# Patient Record
Sex: MALE | Race: BLACK OR AFRICAN AMERICAN | Employment: OTHER | URBAN - METROPOLITAN AREA
[De-identification: names, ages, dates, MRNs, and addresses within clinical notes are randomized per-mention and may not be internally consistent; named-entity substitution may affect disease eponyms.]

---

## 2018-10-30 ENCOUNTER — HOSPITAL ENCOUNTER (EMERGENCY)
Facility: HOSPITAL | Age: 58
Discharge: HOME | End: 2018-10-30
Payer: MEDICAID

## 2018-10-30 ENCOUNTER — APPOINTMENT (OUTPATIENT)
Dept: GENERAL RADIOLOGY | Facility: HOSPITAL | Age: 58
End: 2018-10-30
Payer: MEDICAID

## 2018-10-30 VITALS
HEIGHT: 65 IN | BODY MASS INDEX: 18.99 KG/M2 | WEIGHT: 114 LBS | HEART RATE: 70 BPM | OXYGEN SATURATION: 98 % | RESPIRATION RATE: 24 BRPM | SYSTOLIC BLOOD PRESSURE: 142 MMHG | DIASTOLIC BLOOD PRESSURE: 80 MMHG | TEMPERATURE: 98 F

## 2018-10-30 DIAGNOSIS — J44.1 COPD EXACERBATION (HCC): Primary | ICD-10-CM

## 2018-10-30 DIAGNOSIS — I10 ESSENTIAL HYPERTENSION: ICD-10-CM

## 2018-10-30 PROCEDURE — 94640 AIRWAY INHALATION TREATMENT: CPT

## 2018-10-30 PROCEDURE — 71046 X-RAY EXAM CHEST 2 VIEWS: CPT

## 2018-10-30 PROCEDURE — 99284 EMERGENCY DEPT VISIT MOD MDM: CPT

## 2018-10-30 RX ORDER — IPRATROPIUM BROMIDE AND ALBUTEROL SULFATE 2.5; .5 MG/3ML; MG/3ML
3 SOLUTION RESPIRATORY (INHALATION) ONCE
Status: COMPLETED | OUTPATIENT
Start: 2018-10-30 | End: 2018-10-30

## 2018-10-30 RX ORDER — FLUTICASONE PROPIONATE AND SALMETEROL 250; 50 UG/1; UG/1
1 POWDER RESPIRATORY (INHALATION) 2 TIMES DAILY
Qty: 1 PACKAGE | Refills: 3 | Status: SHIPPED | OUTPATIENT
Start: 2018-10-30 | End: 2018-11-29

## 2018-10-30 RX ORDER — ALBUTEROL SULFATE 90 UG/1
2 AEROSOL, METERED RESPIRATORY (INHALATION) EVERY 4 HOURS PRN
Qty: 1 INHALER | Refills: 2 | Status: SHIPPED | OUTPATIENT
Start: 2018-10-30 | End: 2018-11-29

## 2018-10-30 RX ORDER — ALBUTEROL SULFATE 2.5 MG/3ML
2.5 SOLUTION RESPIRATORY (INHALATION) EVERY 4 HOURS PRN
Qty: 30 AMPULE | Refills: 2 | Status: SHIPPED | OUTPATIENT
Start: 2018-10-30 | End: 2018-11-29

## 2018-10-30 RX ORDER — PREDNISONE 20 MG/1
40 TABLET ORAL ONCE
Status: COMPLETED | OUTPATIENT
Start: 2018-10-30 | End: 2018-10-30

## 2018-10-30 RX ORDER — LISINOPRIL 20 MG/1
20 TABLET ORAL DAILY
Qty: 30 TABLET | Refills: 3 | Status: SHIPPED | OUTPATIENT
Start: 2018-10-30 | End: 2018-11-29

## 2018-10-30 RX ORDER — CITALOPRAM 20 MG/1
20 TABLET ORAL DAILY
Qty: 30 TABLET | Refills: 0 | Status: SHIPPED | OUTPATIENT
Start: 2018-10-30 | End: 2018-11-29

## 2018-10-30 RX ORDER — PREDNISONE 20 MG/1
40 TABLET ORAL DAILY
Qty: 10 TABLET | Refills: 0 | Status: SHIPPED | OUTPATIENT
Start: 2018-10-30 | End: 2018-11-04

## 2018-10-30 NOTE — ED PROVIDER NOTES
Patient Seen in: [facility] AND [facility] Emergency Department    History   No chief complaint on file. Stated Complaint: SOB Hx copd    HPI    HPI: Caleb Mann is a 60year old male with a history of COPDwho presents with wheezing and a cough.  Patient ha Temp src Oral   SpO2 98 %   O2 Device None (Room air)       Current:BP (!) 171/97   Pulse 91   Temp 97.7 °F (36.5 °C) (Oral)   Resp 26   Ht 165.1 cm (5' 5\")   Wt 51.7 kg   SpO2 98%   BMI 18.97 kg/m²   PULSE OX Nl on room air          Physical Exam  Cons 3    Citalopram Hydrobromide 20 MG Oral Tab  Take 1 tablet (20 mg total) by mouth daily.   Qty: 30 tablet Refills: 0    fluticasone-salmeterol (ADVAIR DISKUS) 250-50 MCG/DOSE Inhalation Aerosol Powder, Breath Activated  Inhale 1 puff into the lungs 2 (two) Inhalation Nebu Soln  Take 3 mL (2.5 mg total) by nebulization every 4 (four) hours as needed for Wheezing or Shortness of Breath.   Qty: 30 ampule Refills: 2

## 2019-01-09 PROBLEM — I10 ESSENTIAL HYPERTENSION: Status: ACTIVE | Noted: 2019-01-09

## 2019-01-09 PROBLEM — Z87.891 HISTORY OF TOBACCO ABUSE: Status: ACTIVE | Noted: 2019-01-09

## 2019-01-09 PROBLEM — B19.20 HEPATITIS C VIRUS INFECTION WITHOUT HEPATIC COMA, UNSPECIFIED CHRONICITY: Status: ACTIVE | Noted: 2019-01-09

## 2019-01-09 PROBLEM — J44.9 CHRONIC OBSTRUCTIVE PULMONARY DISEASE, UNSPECIFIED COPD TYPE (HCC): Status: ACTIVE | Noted: 2019-01-09

## 2019-01-09 PROBLEM — K74.00 HEPATIC FIBROSIS: Status: ACTIVE | Noted: 2019-01-09

## 2019-01-09 PROCEDURE — 87522 HEPATITIS C REVRS TRNSCRPJ: CPT | Performed by: INTERNAL MEDICINE

## 2019-01-09 PROCEDURE — 87902 NFCT AGT GNTYP ALYS HEP C: CPT | Performed by: INTERNAL MEDICINE

## 2019-01-09 PROCEDURE — 86803 HEPATITIS C AB TEST: CPT | Performed by: INTERNAL MEDICINE

## 2019-01-17 PROCEDURE — 86706 HEP B SURFACE ANTIBODY: CPT | Performed by: INTERNAL MEDICINE

## 2019-01-17 PROCEDURE — 86709 HEPATITIS A IGM ANTIBODY: CPT | Performed by: INTERNAL MEDICINE

## 2019-01-17 PROCEDURE — 84460 ALANINE AMINO (ALT) (SGPT): CPT | Performed by: INTERNAL MEDICINE

## 2019-01-17 PROCEDURE — 84520 ASSAY OF UREA NITROGEN: CPT | Performed by: INTERNAL MEDICINE

## 2019-01-17 PROCEDURE — 86708 HEPATITIS A ANTIBODY: CPT | Performed by: INTERNAL MEDICINE

## 2019-01-17 PROCEDURE — 84450 TRANSFERASE (AST) (SGOT): CPT | Performed by: INTERNAL MEDICINE

## 2019-01-17 PROCEDURE — 82977 ASSAY OF GGT: CPT | Performed by: INTERNAL MEDICINE

## 2019-01-17 PROCEDURE — 83883 ASSAY NEPHELOMETRY NOT SPEC: CPT | Performed by: INTERNAL MEDICINE

## 2019-03-27 PROBLEM — F33.1 MODERATE EPISODE OF RECURRENT MAJOR DEPRESSIVE DISORDER (HCC): Status: ACTIVE | Noted: 2019-03-27

## 2019-03-27 PROBLEM — R91.1 LUNG NODULE: Status: ACTIVE | Noted: 2019-03-27

## 2019-03-27 PROBLEM — I70.0 AORTIC ATHEROSCLEROSIS (HCC): Status: ACTIVE | Noted: 2019-03-27

## 2019-10-27 ENCOUNTER — APPOINTMENT (OUTPATIENT)
Dept: ULTRASOUND IMAGING | Facility: HOSPITAL | Age: 59
End: 2019-10-27
Attending: PHYSICIAN ASSISTANT
Payer: MEDICAID

## 2019-10-27 ENCOUNTER — APPOINTMENT (OUTPATIENT)
Dept: GENERAL RADIOLOGY | Facility: HOSPITAL | Age: 59
End: 2019-10-27
Attending: PHYSICIAN ASSISTANT
Payer: MEDICAID

## 2019-10-27 ENCOUNTER — HOSPITAL ENCOUNTER (EMERGENCY)
Facility: HOSPITAL | Age: 59
Discharge: HOME | End: 2019-10-27
Attending: PHYSICIAN ASSISTANT
Payer: MEDICAID

## 2019-10-27 VITALS
BODY MASS INDEX: 19.66 KG/M2 | HEART RATE: 84 BPM | OXYGEN SATURATION: 96 % | TEMPERATURE: 98 F | RESPIRATION RATE: 16 BRPM | SYSTOLIC BLOOD PRESSURE: 143 MMHG | WEIGHT: 118 LBS | DIASTOLIC BLOOD PRESSURE: 88 MMHG | HEIGHT: 65 IN

## 2019-10-27 DIAGNOSIS — R03.0 ELEVATED BLOOD PRESSURE READING: ICD-10-CM

## 2019-10-27 DIAGNOSIS — S80.12XA TRAUMATIC HEMATOMA OF LEFT LOWER LEG, INITIAL ENCOUNTER: Primary | ICD-10-CM

## 2019-10-27 PROCEDURE — 93971 EXTREMITY STUDY: CPT | Performed by: PHYSICIAN ASSISTANT

## 2019-10-27 PROCEDURE — 99284 EMERGENCY DEPT VISIT MOD MDM: CPT

## 2019-10-27 PROCEDURE — 73590 X-RAY EXAM OF LOWER LEG: CPT | Performed by: PHYSICIAN ASSISTANT

## 2019-10-27 RX ORDER — TRAMADOL HYDROCHLORIDE 50 MG/1
50 TABLET ORAL EVERY 6 HOURS PRN
Qty: 12 TABLET | Refills: 0 | Status: SHIPPED | OUTPATIENT
Start: 2019-10-27 | End: 2019-11-05
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
## 2019-10-28 NOTE — ED NOTES
Patient given discharge instructions and prescriptions. Patient verbalized understanding. Wheeled out of ED by family.
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
## 2020-04-07 ENCOUNTER — HOSPITAL ENCOUNTER (EMERGENCY)
Facility: HOSPITAL | Age: 60
Discharge: HOME | End: 2020-04-07
Attending: EMERGENCY MEDICINE
Payer: MEDICAID

## 2020-04-07 ENCOUNTER — APPOINTMENT (OUTPATIENT)
Dept: GENERAL RADIOLOGY | Facility: HOSPITAL | Age: 60
End: 2020-04-07
Payer: MEDICAID

## 2020-04-07 VITALS
SYSTOLIC BLOOD PRESSURE: 159 MMHG | RESPIRATION RATE: 18 BRPM | HEIGHT: 65 IN | WEIGHT: 118 LBS | TEMPERATURE: 98 F | OXYGEN SATURATION: 96 % | HEART RATE: 88 BPM | DIASTOLIC BLOOD PRESSURE: 78 MMHG | BODY MASS INDEX: 19.66 KG/M2

## 2020-04-07 DIAGNOSIS — S42.212A CLOSED FRACTURE OF NECK OF LEFT HUMERUS, INITIAL ENCOUNTER: Primary | ICD-10-CM

## 2020-04-07 PROCEDURE — 73030 X-RAY EXAM OF SHOULDER: CPT | Performed by: EMERGENCY MEDICINE

## 2020-04-07 PROCEDURE — 99284 EMERGENCY DEPT VISIT MOD MDM: CPT

## 2020-04-07 RX ORDER — HYDROCODONE BITARTRATE AND ACETAMINOPHEN 5; 325 MG/1; MG/1
2 TABLET ORAL ONCE
Status: COMPLETED | OUTPATIENT
Start: 2020-04-07 | End: 2020-04-07
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
## 2020-04-08 NOTE — ED PROVIDER NOTES
Patient Seen in: [facility] AND [facility] Emergency Department    History   Patient presents with:  Upper Extremity Injury      HPI    Patient presents to the ED after falling at [place] and landing on his left shoulder.   He states that he fell due to trippin droplet mask on my arrival to the room.  Personal protective equipment including droplet mask, eye protection, and gloves were worn throughout the duration of the exam.  Handwashing was performed prior to and after the exam.  Stethoscope and any equipment u SpO2: 95% 96%   Weight: 53.5 kg    Height: 165.1 cm (5' 5\")      *I personally reviewed and interpreted all ED vitals.     Pulse Ox: 96%, Room air, Normal     Differential Diagnosis/ Diagnostic Considerations: Humeral fracture, shoulder dislocation    Me

## 2020-04-20 ENCOUNTER — APPOINTMENT (OUTPATIENT)
Dept: CT IMAGING | Facility: HOSPITAL | Age: 60
DRG: 191 | End: 2020-04-20
Attending: EMERGENCY MEDICINE
Payer: MEDICAID

## 2020-04-20 ENCOUNTER — HOSPITAL ENCOUNTER (INPATIENT)
Facility: HOSPITAL | Age: 60
LOS: 2 days | Discharge: HOME HEALTH | DRG: 191 | End: 2020-04-22
Attending: EMERGENCY MEDICINE | Admitting: INTERNAL MEDICINE
Payer: MEDICAID

## 2020-04-20 ENCOUNTER — APPOINTMENT (OUTPATIENT)
Dept: GENERAL RADIOLOGY | Facility: HOSPITAL | Age: 60
DRG: 191 | End: 2020-04-20
Attending: EMERGENCY MEDICINE
Payer: MEDICAID

## 2020-04-20 DIAGNOSIS — J44.1 COPD EXACERBATION (HCC): Primary | ICD-10-CM

## 2020-04-20 PROBLEM — E87.1 HYPONATREMIA: Status: ACTIVE | Noted: 2020-04-20

## 2020-04-20 PROBLEM — E87.2 METABOLIC ACIDOSIS: Status: ACTIVE | Noted: 2020-04-20

## 2020-04-20 PROBLEM — D64.9 ANEMIA: Status: ACTIVE | Noted: 2020-04-20

## 2020-04-20 PROBLEM — R73.9 HYPERGLYCEMIA: Status: ACTIVE | Noted: 2020-04-20

## 2020-04-20 PROBLEM — E87.20 METABOLIC ACIDOSIS: Status: ACTIVE | Noted: 2020-04-20

## 2020-04-20 PROBLEM — R79.89 AZOTEMIA: Status: ACTIVE | Noted: 2020-04-20

## 2020-04-20 PROCEDURE — 84484 ASSAY OF TROPONIN QUANT: CPT | Performed by: EMERGENCY MEDICINE

## 2020-04-20 PROCEDURE — 84145 PROCALCITONIN (PCT): CPT | Performed by: HOSPITALIST

## 2020-04-20 PROCEDURE — 93005 ELECTROCARDIOGRAM TRACING: CPT

## 2020-04-20 PROCEDURE — 83615 LACTATE (LD) (LDH) ENZYME: CPT | Performed by: HOSPITALIST

## 2020-04-20 PROCEDURE — 94640 AIRWAY INHALATION TREATMENT: CPT

## 2020-04-20 PROCEDURE — 93010 ELECTROCARDIOGRAM REPORT: CPT | Performed by: EMERGENCY MEDICINE

## 2020-04-20 PROCEDURE — 86141 C-REACTIVE PROTEIN HS: CPT | Performed by: HOSPITALIST

## 2020-04-20 PROCEDURE — 82728 ASSAY OF FERRITIN: CPT | Performed by: HOSPITALIST

## 2020-04-20 PROCEDURE — 85379 FIBRIN DEGRADATION QUANT: CPT | Performed by: EMERGENCY MEDICINE

## 2020-04-20 PROCEDURE — 80048 BASIC METABOLIC PNL TOTAL CA: CPT | Performed by: EMERGENCY MEDICINE

## 2020-04-20 PROCEDURE — 85025 COMPLETE CBC W/AUTO DIFF WBC: CPT | Performed by: EMERGENCY MEDICINE

## 2020-04-20 PROCEDURE — 96374 THER/PROPH/DIAG INJ IV PUSH: CPT

## 2020-04-20 PROCEDURE — 71045 X-RAY EXAM CHEST 1 VIEW: CPT | Performed by: EMERGENCY MEDICINE

## 2020-04-20 PROCEDURE — 99285 EMERGENCY DEPT VISIT HI MDM: CPT

## 2020-04-20 PROCEDURE — 80076 HEPATIC FUNCTION PANEL: CPT | Performed by: HOSPITALIST

## 2020-04-20 PROCEDURE — 85379 FIBRIN DEGRADATION QUANT: CPT | Performed by: HOSPITALIST

## 2020-04-20 PROCEDURE — 71260 CT THORAX DX C+: CPT | Performed by: EMERGENCY MEDICINE

## 2020-04-20 RX ORDER — ENOXAPARIN SODIUM 100 MG/ML
40 INJECTION SUBCUTANEOUS DAILY
Status: DISCONTINUED | OUTPATIENT
Start: 2020-04-20 | End: 2020-04-22

## 2020-04-20 RX ORDER — ONDANSETRON 2 MG/ML
4 INJECTION INTRAMUSCULAR; INTRAVENOUS EVERY 6 HOURS PRN
Status: DISCONTINUED | OUTPATIENT
Start: 2020-04-20 | End: 2020-04-22

## 2020-04-20 RX ORDER — METHYLPREDNISOLONE SODIUM SUCCINATE 40 MG/ML
40 INJECTION, POWDER, LYOPHILIZED, FOR SOLUTION INTRAMUSCULAR; INTRAVENOUS EVERY 8 HOURS
Status: COMPLETED | OUTPATIENT
Start: 2020-04-20 | End: 2020-04-20

## 2020-04-20 RX ORDER — MELATONIN
100 DAILY
Status: DISCONTINUED | OUTPATIENT
Start: 2020-04-20 | End: 2020-04-22

## 2020-04-20 RX ORDER — PREDNISONE 20 MG/1
40 TABLET ORAL
Status: DISCONTINUED | OUTPATIENT
Start: 2020-04-21 | End: 2020-04-22

## 2020-04-20 RX ORDER — ALBUTEROL SULFATE 90 UG/1
2 AEROSOL, METERED RESPIRATORY (INHALATION) EVERY 6 HOURS PRN
Status: ON HOLD | COMMUNITY
End: 2020-04-22

## 2020-04-20 RX ORDER — MULTIPLE VITAMINS W/ MINERALS TAB 9MG-400MCG
1 TAB ORAL DAILY
Status: DISCONTINUED | OUTPATIENT
Start: 2020-04-20 | End: 2020-04-22

## 2020-04-20 RX ORDER — SODIUM CHLORIDE 0.9 % (FLUSH) 0.9 %
3 SYRINGE (ML) INJECTION AS NEEDED
Status: DISCONTINUED | OUTPATIENT
Start: 2020-04-20 | End: 2020-04-22

## 2020-04-20 RX ORDER — ACETAMINOPHEN 325 MG/1
650 TABLET ORAL EVERY 6 HOURS PRN
Status: DISCONTINUED | OUTPATIENT
Start: 2020-04-20 | End: 2020-04-22

## 2020-04-20 RX ORDER — METHYLPREDNISOLONE SODIUM SUCCINATE 125 MG/2ML
125 INJECTION, POWDER, LYOPHILIZED, FOR SOLUTION INTRAMUSCULAR; INTRAVENOUS ONCE
Status: COMPLETED | OUTPATIENT
Start: 2020-04-20 | End: 2020-04-20

## 2020-04-20 RX ORDER — ETOMIDATE 2 MG/ML
INJECTION INTRAVENOUS
Status: DISCONTINUED
Start: 2020-04-20 | End: 2020-04-20 | Stop reason: WASHOUT

## 2020-04-20 RX ORDER — METOCLOPRAMIDE HYDROCHLORIDE 5 MG/ML
10 INJECTION INTRAMUSCULAR; INTRAVENOUS EVERY 8 HOURS PRN
Status: DISCONTINUED | OUTPATIENT
Start: 2020-04-20 | End: 2020-04-22

## 2020-04-20 RX ORDER — ALBUTEROL SULFATE 90 UG/1
2 AEROSOL, METERED RESPIRATORY (INHALATION) EVERY 4 HOURS
Status: DISCONTINUED | OUTPATIENT
Start: 2020-04-20 | End: 2020-04-22

## 2020-04-20 RX ORDER — ALBUTEROL SULFATE 90 UG/1
2 AEROSOL, METERED RESPIRATORY (INHALATION) ONCE
Status: COMPLETED | OUTPATIENT
Start: 2020-04-20 | End: 2020-04-20

## 2020-04-20 NOTE — HOME CARE LIAISON
Received referral from [address] [place] [address]. Spoke to the patient regarding home health services. Patient is agreeable to [facility]. Residential contact information provided. All questions addressed and answered.

## 2020-04-20 NOTE — H&P
[facility] HOSPITALIST HISTORY AND PHYSICAL     CC: Patient presents with:  Dyspnea JANIA SOB       PCP: Augustine Beth MD    History of Present Illness:   Patient is a 60year old male with PMH sig for COPD, former smoker, HTN, MDD here with SOB- sudden onset no rashes, no lesion  PSYCH- normal mentation, normal affect      LABS:   Lab Results   Component Value Date    WBC 9.7 04/20/2020    HGB 9.5 04/20/2020    HCT 30.2 04/20/2020    .0 04/20/2020    CREATSERUM 1.16 04/20/2020    BUN 26 04/20/2020    NA 4/20/2020 at 6:18 AM          Ct Chest Pain/pe (iv Only) (cpt=71260)    Result Date: 4/20/2020  PROCEDURE: CT CHEST PAIN/PE (IV ONLY) (CPT=71260)  COMPARISON: [facility], XR CHEST AP PORTABLE (CPT=71045), 4/20/2020, 4:02 AM.  INDICATIONS: d discussed. Indeterminate focal density in the right upper lobe measuring 1.1 x 0.8 cm may be an area of scarring, but scar carcinoma, early mass cannot be excluded.   Recommend short-term follow-up study in 3 months to further assess or possible PET scanni

## 2020-04-20 NOTE — CONSULTS
Pulmonary H&P/Consult     NAME: Erick Members - ROOM: [address] [place], [place] [address]A - MRN: [id number] - Age: 60year old - :  1960    Date of Admission: 2020  3:17 AM  Ad[facility] Diagnosis: [facility] exacerbation [facility] [J44.1]    Assessment/Plan:  1.  Dyspnea - from CO HPI    OBJECTIVE:    Intake/Output Summary (Last 24 hours) at 4/20/2020 1035  Last data filed at 4/20/2020 0800  Gross per 24 hour   Intake —   Output 200 ml   Net -200 ml     /85   Pulse 91   Temp 98 °F (36.7 °C)   Resp 26   Ht 5' 5\" (1.651 m)   [facility]
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
## 2020-04-20 NOTE — PLAN OF CARE
Problem: Patient/Family Goals  Goal: Patient/Family Long Term Goal  Description  Patient's Long Term Goal: to return home with friends  Interventions:  - 02, rest  - See additional Care Plan goals for specific interventions  Outcome: Progressing  Goal: P

## 2020-04-20 NOTE — ED PROVIDER NOTES
Patient Seen in: [facility] Emergency Department      History   Patient presents with:  Dyspnea JANIA SOB    Stated Complaint:     HPI    61 yo male with h/o COPD presents with difficulty breathing. No chest pain. Has had mild cough. No fever.  No kn Refill: Capillary refill takes less than 2 seconds. Neurological:      Mental Status: He is alert. Sensory: No sensory deficit. Motor: No abnormal muscle tone.    Psychiatric:         Mood and Affect: Mood normal.         Behavior: Behavior norm and right lower lung, unclear if this is related to an acute process or chronic areas of scarring or atelectasis. Elsewhere, no focal consolidation. No pneumothorax. Hyperinflated lungs suggestive of COPD. Cardiomediastinal silhouette unremarkable.   Co

## 2020-04-20 NOTE — DIETARY NOTE
ADULT NUTRITION INITIAL ASSESSMENT    RECOMMENDATIONS TO MD:  See Nutrition Intervention     Pt is at moderate nutrition risk. Pt meets moderate malnutrition criteria.       CRITERIA FOR MALNUTRITION DIAGNOSIS: Criteria for non-severe malnutrition diagnosi supplements), Requested  with ordering meals, tray set up and/or feeding as needed, Ordered multivitamin and Ordered thiamin, arranged snacks.    -  Diet:     Continue prescribed diet, Regular/General and Chopped  - Medical Food or Oral Nutri Estimated Nutrition needs:   Calories: 1686-1481 calories/day (30-35 calories per kg Actual body wt (ABW))  Protein: 74-83 g protein/day (1.5-1.7 g protein/kg  Actual body wt (ABW))    MONITOR AND EVALUATE/NUTRITION GOALS:  - Food and Nutrient Intake:    Marko Dempsey

## 2020-04-20 NOTE — CM/SW NOTE
Received MDO for assessment home health. Spoke with patient for assessment. Patient states he lives with his 2 lifelong friends (who are brothers) in a 2 story home. Patient states he is independent with ADLs/IADLs, he has a cane which he does not use.  He

## 2020-04-21 PROCEDURE — 86140 C-REACTIVE PROTEIN: CPT | Performed by: HOSPITALIST

## 2020-04-21 PROCEDURE — 80053 COMPREHEN METABOLIC PANEL: CPT | Performed by: HOSPITALIST

## 2020-04-21 PROCEDURE — 85025 COMPLETE CBC W/AUTO DIFF WBC: CPT | Performed by: HOSPITALIST

## 2020-04-21 PROCEDURE — 85379 FIBRIN DEGRADATION QUANT: CPT | Performed by: HOSPITALIST

## 2020-04-21 PROCEDURE — 83615 LACTATE (LD) (LDH) ENZYME: CPT | Performed by: HOSPITALIST

## 2020-04-21 RX ORDER — TRAMADOL HYDROCHLORIDE 50 MG/1
50 TABLET ORAL EVERY 6 HOURS PRN
Status: DISCONTINUED | OUTPATIENT
Start: 2020-04-21 | End: 2020-04-22

## 2020-04-21 NOTE — PAYOR COMM NOTE
--------------  ADMISSION REVIEW     Payor: Helio Berry #:  [id number]  Authorization Number: [id number]    Admit date: 4/20/20  Admit time: 9165       Admitting Physician: Ashley Green DO  Attending Physician deficits  SKIN- no rashes, no lesion  PSYCH- normal mentation, normal affect      LABS:   Lab Results   Component Value Date    WBC 9.7 04/20/2020    HGB 9.5 04/20/2020    HCT 30.2 04/20/2020    .0 04/20/2020    CREATSERUM 1.16 04/20/2020    BUN 26 Stacy Godwin MD on 4/20/2020 at 6:18 AM          Ct Chest Pain/pe (iv Only) (cpt=71260)    Result Date: 4/20/2020  PROCEDURE: CT CHEST PAIN/PE (IV ONLY) (CPT=71260)  COMPARISON: [facility], XR CHEST AP PORTABLE (CPT=71045), 4/20/2020, 4:02 AM.  I bilaterally as discussed. Indeterminate focal density in the right upper lobe measuring 1.1 x 0.8 cm may be an area of scarring, but scar carcinoma, early mass cannot be excluded.   Recommend short-term follow-up study in 3 months to further assess or poss Dose Route User    4/21/2020 0620 Given 2 puff Inhalation Connie Del Rosario RN    4/21/2020 0026 Given 2 puff Inhalation Connie Del Rosario RN    4/20/2020 2039 Given 2 puff Inhalation Connie Del Rosario [facility]    4/20/2020 1530 Given 2 puff Inhalation Rita Gee MRG  Abdo - soft, NTND  Ext - no LE edema  Mental status - interactive, answering questions appropriately     Medications:  Reviewed in EMR     Lab Data:  Reviewed in EMR     Imaging:  I independently visualized all relevant chest imaging in PACS and agree

## 2020-04-21 NOTE — PROGRESS NOTES
Critical Care Progress Note     Assessment / Plan:  1. Dyspnea - from COPD exacerbation   - on RA  - IV steroids to po today  - albuterol MDI  - SARS-COV-2 rapid testing negative, PCR pending. In isolation  - CTA PE protocol without PE  2.  Pulmonary nodule

## 2020-04-21 NOTE — PROGRESS NOTES
[facility] Hospitalist Progress Note     PCP: Jonathon Babin MD    Chief Complaint: follow-up      SUBJECTIVE:  BP up, HR to 160s with exertion pt reports feeling very SOB at the time     OBJECTIVE:  Temp:  [97.2 °F (36.2 °C)-99 °F (37.2 °C)] 97.2 °F (36.2 x 0.8 cm lesions- scar v other  MDD/ anxiety  HTN     SOB and hypoxia  - rapid COVID swab neg, f/u test neg as well  - D dimer elevated- CT neg for acute PE  - findings c/w COPD changes also incidental RUL lesion noted  - started on steroids for COPD exac

## 2020-04-22 VITALS
WEIGHT: 113.63 LBS | HEIGHT: 65 IN | RESPIRATION RATE: 20 BRPM | HEART RATE: 79 BPM | BODY MASS INDEX: 18.93 KG/M2 | OXYGEN SATURATION: 94 % | SYSTOLIC BLOOD PRESSURE: 161 MMHG | TEMPERATURE: 98 F | DIASTOLIC BLOOD PRESSURE: 83 MMHG

## 2020-04-22 PROCEDURE — 97116 GAIT TRAINING THERAPY: CPT

## 2020-04-22 PROCEDURE — 97161 PT EVAL LOW COMPLEX 20 MIN: CPT

## 2020-04-22 PROCEDURE — 85025 COMPLETE CBC W/AUTO DIFF WBC: CPT | Performed by: HOSPITALIST

## 2020-04-22 PROCEDURE — 80048 BASIC METABOLIC PNL TOTAL CA: CPT | Performed by: INTERNAL MEDICINE

## 2020-04-22 PROCEDURE — 83735 ASSAY OF MAGNESIUM: CPT | Performed by: INTERNAL MEDICINE

## 2020-04-22 PROCEDURE — 97162 PT EVAL MOD COMPLEX 30 MIN: CPT

## 2020-04-22 PROCEDURE — 97166 OT EVAL MOD COMPLEX 45 MIN: CPT

## 2020-04-22 RX ORDER — PREDNISONE 20 MG/1
40 TABLET ORAL
Qty: 6 TABLET | Refills: 0 | Status: SHIPPED | OUTPATIENT
Start: 2020-04-23 | End: 2020-04-26

## 2020-04-22 RX ORDER — ALBUTEROL SULFATE 90 UG/1
2 AEROSOL, METERED RESPIRATORY (INHALATION) EVERY 6 HOURS PRN
Qty: 1 INHALER | Refills: 0 | Status: SHIPPED | OUTPATIENT
Start: 2020-04-22 | End: 2020-04-25

## 2020-04-22 RX ORDER — TRAMADOL HYDROCHLORIDE 50 MG/1
50 TABLET ORAL EVERY 6 HOURS PRN
Qty: 20 TABLET | Refills: 0 | Status: SHIPPED | OUTPATIENT
Start: 2020-04-22 | End: 2020-07-01

## 2020-04-22 RX ORDER — MELATONIN
100 DAILY
Qty: 30 TABLET | Refills: 0 | Status: SHIPPED | OUTPATIENT
Start: 2020-04-23

## 2020-04-22 NOTE — PHYSICAL THERAPY NOTE
PHYSICAL THERAPY EVALUATION - INPATIENT     Room Number: 562/562-A  Evaluation Date: 4/22/2020  Type of Evaluation: Initial   Physician Order: See Comment for Specific Order(weakness)    Presenting Problem: SOB, hypoxia, COPD  Reason for Therapy: Mobility status. The patient's Approx Degree of Impairment: 41.77% has been calculated based on documentation in the [facility] '6 clicks' Inpatient Basic Mobility Short Form. Research supports that patients with this level of impairment may benefit from home with [facility]. ASSESSMENT  Rating: Other (Comment)(no c/o pain)    COGNITION  · Overall Cognitive Status:  WFL - within functional limits    RANGE OF MOTION AND STRENGTH ASSESSMENT  Upper extremity ROM and strength are within functional limits except LUE remained in immo training  Transfer training    Patient End of Session: Up in chair;Needs met;RN aware of session/findings;Call light within reach; All patient questions and concerns addressed; Alarm set    CURRENT GOALS    Goals to be met by: 5/6/20  Patient Goal Patient's

## 2020-04-22 NOTE — PLAN OF CARE
Problem: Patient/Family Goals  Goal: Patient/Family Long Term Goal  Description  Patient's Long Term Goal: return home     Interventions:    - follow doctors recommendations     - See additional Care Plan goals for specific interventions   Outcome: Progr alert and oriented x4, on room air sat 95%, patient high fall risk ,patient instructed to call for assistance when needed , droplet and contact isolation maintained

## 2020-04-22 NOTE — OCCUPATIONAL THERAPY NOTE
OCCUPATIONAL THERAPY EVALUATION - INPATIENT     Room Number: 562/562-A  Evaluation Date: 4/22/2020  Type of Evaluation: Initial  Presenting Problem: (COPD exacerbation )    Physician Order: IP Consult to Occupational Therapy  Reason for Therapy: ADL/IADL D [facility])  Active Problems:    Hyponatremia    Anemia    Azotemia    Metabolic acidosis    Hyperglycemia      Past Medical History  Past Medical History:   Diagnosis Date   • Asthma    • COPD (chronic obstructive pulmonary disease) ([facility]    • Depression    • E Sitting: good  Dynamic Sitting: good  Static Standing: good  Dynamic Standing: fair+     FUNCTIONAL ADL ASSESSMENT  Grooming: mod I   Feeding: indep  Bathing: min a   Toileting: sba  Upper Extremity Dressing: min a   Lower Extremity Dressing: min a     Edu

## 2020-04-22 NOTE — PROGRESS NOTES
Pulmonary Progress Note     Assessment / Plan:  1. Dyspnea - due to AECOPD. CTA chest negative for PE. SARS-COV-2 testing negative  - on RA  - prednisone burst  - albuterol MDI  2. Pulmonary nodule  - outpatient surveillance imaging in 3 months  3.  Ppx  -

## 2020-04-22 NOTE — PLAN OF CARE
Patient transfer from CCU room 211, patient received with remote tele,patient wearing left shoulder sling, has his cell phone, lower and upper dentures, clothes.  Patient alert and oriented x4, vital signs stable , droplet and contact isolations maintained

## 2020-04-22 NOTE — DISCHARGE SUMMARY
[facility] Internal Medicine Discharge Summary   Patient ID:  Brionna Kebede  [id number]  60year old  5/14/1960    Admit date: 4/20/2020    Discharge date and time: 4/22/2020     Attending Physician: Karoline Guzman MD     Primary Care Physician: Beronica Sanchez given    Day of discharge Exam    04/22/20  0957   BP: (!) 161/83   Pulse: 79   Resp: 20   Temp: 97.7 °F (36.5 °C)       Exam on day of discharge:  Gen: No acute distress  CV: RRR  Lungs: CTAB, good respiratory effort  Abdomen: s/nt/nd  Neuro: no focal def LUNGS/PLEURA: Hyperinflation compatible with COPD changes. Patchy airspace disease in the left perihilar and right lung base which may suggest interstitial scarring , although I can't exclude a developing acute pneumonia.   Correlate clinically and follow- changes noted throughout the bilateral lung fields. Focal bullous/bleb formation in the upper lung fields bilaterally right more than left.   Indeterminate focal density at the right upper lobe measuring 1.1 x 0.8  cm may be a area of scarring but scar car Hospitalist  [phone]  4/22/2020  11:37 AM

## 2020-04-22 NOTE — CM/SW NOTE
Case Management/ Progression of Care    A [facility] was made to [facility], Patient is agreeable, Orders and face to face entered into [facility] and in discharge instructions.        PLAN: Home with [facility]    SW/CM to remain availab

## 2020-04-22 NOTE — PLAN OF CARE
Problem: Patient/Family Goals  Goal: Patient/Family Long Term Goal  Description  Patient's Long Term Goal: return home     Interventions:    - follow doctors recommendations     - See additional Care Plan goals for specific interventions   Outcome: Compl

## 2020-04-22 NOTE — PLAN OF CARE
Patient discharged. Transported by [employer]. Discharge forms reviewed with patient. IV removed.

## 2020-04-23 ENCOUNTER — PATIENT OUTREACH (OUTPATIENT)
Dept: CASE MANAGEMENT | Age: 60
End: 2020-04-23
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
## 2020-04-28 PROBLEM — R93.89 ABNORMAL CHEST CT: Status: ACTIVE | Noted: 2020-04-28

## 2020-06-05 PROBLEM — M25.512 ACUTE PAIN OF LEFT SHOULDER: Status: ACTIVE | Noted: 2020-06-05

## 2020-06-05 PROBLEM — S42.292D: Status: ACTIVE | Noted: 2020-06-05

## 2020-08-22 ENCOUNTER — APPOINTMENT (OUTPATIENT)
Dept: CT IMAGING | Facility: HOSPITAL | Age: 60
End: 2020-08-22
Attending: EMERGENCY MEDICINE
Payer: MEDICAID

## 2020-08-22 ENCOUNTER — HOSPITAL ENCOUNTER (EMERGENCY)
Facility: HOSPITAL | Age: 60
Discharge: HOME | End: 2020-08-22
Attending: EMERGENCY MEDICINE
Payer: MEDICAID

## 2020-08-22 ENCOUNTER — APPOINTMENT (OUTPATIENT)
Dept: GENERAL RADIOLOGY | Facility: HOSPITAL | Age: 60
End: 2020-08-22
Attending: EMERGENCY MEDICINE
Payer: MEDICAID

## 2020-08-22 VITALS
RESPIRATION RATE: 18 BRPM | OXYGEN SATURATION: 97 % | DIASTOLIC BLOOD PRESSURE: 81 MMHG | TEMPERATURE: 98 F | SYSTOLIC BLOOD PRESSURE: 151 MMHG | HEART RATE: 82 BPM

## 2020-08-22 DIAGNOSIS — S01.81XA FACIAL LACERATION, INITIAL ENCOUNTER: Primary | ICD-10-CM

## 2020-08-22 DIAGNOSIS — S00.03XA CONTUSION OF SCALP, INITIAL ENCOUNTER: ICD-10-CM

## 2020-08-22 DIAGNOSIS — T07.XXXA MULTIPLE ABRASIONS: ICD-10-CM

## 2020-08-22 DIAGNOSIS — S52.124A CLOSED NONDISPLACED FRACTURE OF HEAD OF RIGHT RADIUS, INITIAL ENCOUNTER: ICD-10-CM

## 2020-08-22 DIAGNOSIS — S80.11XA CONTUSION OF RIGHT LOWER LEG, INITIAL ENCOUNTER: ICD-10-CM

## 2020-08-22 DIAGNOSIS — S52.044A NONDISPLACED FRACTURE OF CORONOID PROCESS OF RIGHT ULNA, INITIAL ENCOUNTER FOR CLOSED FRACTURE: ICD-10-CM

## 2020-08-22 PROCEDURE — 70486 CT MAXILLOFACIAL W/O DYE: CPT | Performed by: EMERGENCY MEDICINE

## 2020-08-22 PROCEDURE — 73590 X-RAY EXAM OF LOWER LEG: CPT | Performed by: EMERGENCY MEDICINE

## 2020-08-22 PROCEDURE — 70450 CT HEAD/BRAIN W/O DYE: CPT | Performed by: EMERGENCY MEDICINE

## 2020-08-22 PROCEDURE — 12014 RPR F/E/E/N/L/M 5.1-7.5 CM: CPT

## 2020-08-22 PROCEDURE — 99284 EMERGENCY DEPT VISIT MOD MDM: CPT

## 2020-08-22 PROCEDURE — 73080 X-RAY EXAM OF ELBOW: CPT | Performed by: EMERGENCY MEDICINE

## 2020-08-22 RX ORDER — LIDOCAINE HYDROCHLORIDE 10 MG/ML
20 INJECTION, SOLUTION EPIDURAL; INFILTRATION; INTRACAUDAL; PERINEURAL ONCE
Status: COMPLETED | OUTPATIENT
Start: 2020-08-22 | End: 2020-08-22

## 2020-08-22 RX ORDER — LIDOCAINE HYDROCHLORIDE 10 MG/ML
INJECTION, SOLUTION EPIDURAL; INFILTRATION; INTRACAUDAL; PERINEURAL
Status: COMPLETED
Start: 2020-08-22 | End: 2020-08-22
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
## 2021-01-06 PROBLEM — I47.10 SVT (SUPRAVENTRICULAR TACHYCARDIA) (HCC): Status: ACTIVE | Noted: 2021-01-06

## 2021-01-06 PROBLEM — E87.1 HYPONATREMIA: Status: RESOLVED | Noted: 2020-04-20 | Resolved: 2021-01-06

## 2021-01-06 PROBLEM — I47.1 SVT (SUPRAVENTRICULAR TACHYCARDIA) (HCC): Status: ACTIVE | Noted: 2021-01-06

## 2021-12-28 ENCOUNTER — HOSPITAL ENCOUNTER (OUTPATIENT)
Dept: CT IMAGING | Facility: HOSPITAL | Age: 61
Discharge: HOME | End: 2021-12-28
Attending: INTERNAL MEDICINE
Payer: MEDICAID

## 2021-12-28 DIAGNOSIS — Z87.891 FORMER TOBACCO USE: ICD-10-CM

## 2021-12-28 PROCEDURE — 71271 CT THORAX LUNG CANCER SCR C-: CPT | Performed by: INTERNAL MEDICINE

## 2023-04-24 ENCOUNTER — HOSPITAL ENCOUNTER (OUTPATIENT)
Dept: CT IMAGING | Facility: HOSPITAL | Age: 63
Discharge: HOME | End: 2023-04-24
Attending: INTERNAL MEDICINE
Payer: MEDICAID

## 2023-04-24 DIAGNOSIS — Z87.891 PERSONAL HISTORY OF NICOTINE DEPENDENCE: ICD-10-CM

## 2023-04-24 PROCEDURE — 71271 CT THORAX LUNG CANCER SCR C-: CPT | Performed by: INTERNAL MEDICINE

## 2023-05-19 ENCOUNTER — HOSPITAL ENCOUNTER (OUTPATIENT)
Dept: CT IMAGING | Facility: HOSPITAL | Age: 63
Discharge: HOME | End: 2023-05-19
Attending: INTERNAL MEDICINE
Payer: MEDICAID

## 2023-05-19 DIAGNOSIS — Z87.891 PERSONAL HISTORY OF TOBACCO USE, PRESENTING HAZARDS TO HEALTH: ICD-10-CM

## 2023-05-19 PROCEDURE — 71271 CT THORAX LUNG CANCER SCR C-: CPT | Performed by: INTERNAL MEDICINE

## 2023-06-01 ENCOUNTER — APPOINTMENT (OUTPATIENT)
Dept: CT IMAGING | Facility: HOSPITAL | Age: 63
End: 2023-06-01
Attending: EMERGENCY MEDICINE
Payer: MEDICAID

## 2023-06-01 ENCOUNTER — APPOINTMENT (OUTPATIENT)
Dept: GENERAL RADIOLOGY | Facility: HOSPITAL | Age: 63
End: 2023-06-01
Attending: EMERGENCY MEDICINE
Payer: MEDICAID

## 2023-06-01 ENCOUNTER — HOSPITAL ENCOUNTER (EMERGENCY)
Facility: HOSPITAL | Age: 63
Discharge: OTHER INSTITUTION | End: 2023-06-01
Attending: EMERGENCY MEDICINE
Payer: MEDICAID

## 2023-06-01 VITALS
BODY MASS INDEX: 19.99 KG/M2 | HEIGHT: 65 IN | SYSTOLIC BLOOD PRESSURE: 125 MMHG | TEMPERATURE: 98 F | DIASTOLIC BLOOD PRESSURE: 70 MMHG | HEART RATE: 62 BPM | WEIGHT: 120 LBS | OXYGEN SATURATION: 99 % | RESPIRATION RATE: 18 BRPM

## 2023-06-01 DIAGNOSIS — S20.212A RIB CONTUSION, LEFT, INITIAL ENCOUNTER: Primary | ICD-10-CM

## 2023-06-01 DIAGNOSIS — S02.85XB OPEN FRACTURE OF ORBIT, INITIAL ENCOUNTER (HCC): ICD-10-CM

## 2023-06-01 DIAGNOSIS — S22.42XA CLOSED FRACTURE OF MULTIPLE RIBS OF LEFT SIDE, INITIAL ENCOUNTER: ICD-10-CM

## 2023-06-01 DIAGNOSIS — S01.111A LACERATION OF RIGHT EYEBROW, INITIAL ENCOUNTER: ICD-10-CM

## 2023-06-01 PROCEDURE — 96375 TX/PRO/DX INJ NEW DRUG ADDON: CPT

## 2023-06-01 PROCEDURE — 70486 CT MAXILLOFACIAL W/O DYE: CPT | Performed by: EMERGENCY MEDICINE

## 2023-06-01 PROCEDURE — 96366 THER/PROPH/DIAG IV INF ADDON: CPT

## 2023-06-01 PROCEDURE — 12013 RPR F/E/E/N/L/M 2.6-5.0 CM: CPT

## 2023-06-01 PROCEDURE — 96376 TX/PRO/DX INJ SAME DRUG ADON: CPT

## 2023-06-01 PROCEDURE — 96365 THER/PROPH/DIAG IV INF INIT: CPT

## 2023-06-01 PROCEDURE — 71101 X-RAY EXAM UNILAT RIBS/CHEST: CPT | Performed by: EMERGENCY MEDICINE

## 2023-06-01 PROCEDURE — 99291 CRITICAL CARE FIRST HOUR: CPT

## 2023-06-01 PROCEDURE — 70450 CT HEAD/BRAIN W/O DYE: CPT | Performed by: EMERGENCY MEDICINE

## 2023-06-01 RX ORDER — HYDROCODONE BITARTRATE AND ACETAMINOPHEN 5; 325 MG/1; MG/1
0.5 TABLET ORAL EVERY 6 HOURS PRN
Qty: 5 TABLET | Refills: 0 | Status: SHIPPED | OUTPATIENT
Start: 2023-06-01 | End: 2023-06-02

## 2023-06-01 RX ORDER — MORPHINE SULFATE 2 MG/ML
2 INJECTION, SOLUTION INTRAMUSCULAR; INTRAVENOUS ONCE
Status: COMPLETED | OUTPATIENT
Start: 2023-06-01 | End: 2023-06-01

## 2023-06-01 RX ORDER — MORPHINE SULFATE 2 MG/ML
INJECTION, SOLUTION INTRAMUSCULAR; INTRAVENOUS
Status: COMPLETED
Start: 2023-06-01 | End: 2023-06-01

## 2023-06-01 RX ORDER — LIDOCAINE HYDROCHLORIDE AND EPINEPHRINE 20; 5 MG/ML; UG/ML
20 INJECTION, SOLUTION EPIDURAL; INFILTRATION; INTRACAUDAL; PERINEURAL ONCE
Status: COMPLETED | OUTPATIENT
Start: 2023-06-01 | End: 2023-06-01

## 2023-06-01 RX ORDER — MORPHINE SULFATE 4 MG/ML
4 INJECTION, SOLUTION INTRAMUSCULAR; INTRAVENOUS ONCE
Status: COMPLETED | OUTPATIENT
Start: 2023-06-01 | End: 2023-06-01

## 2023-06-01 RX ORDER — AMOXICILLIN AND CLAVULANATE POTASSIUM 875; 125 MG/1; MG/1
1 TABLET, FILM COATED ORAL 2 TIMES DAILY
Qty: 20 TABLET | Refills: 0 | Status: SHIPPED | OUTPATIENT
Start: 2023-06-01 | End: 2023-06-02

## 2023-06-01 NOTE — CM/SW NOTE
Per [facility], patient has been accepted by:    Dr. Florentin Mendosa, plastics    Patient being transferred ER to ER. Spoke to Willheather Lisaors Micky at [facility] and she will call [facility] with ER information for transfer. CD copy burned and given to RN    PCS form completed in Epic    RN updated. 610pm    Ahsan Coors Micky at [facility] stated that she has set up transportation for patient. [facility] ambulance arranged. ETA 2.5 hours (approx 830pm)    PCS form completed in Epic.
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
## 2023-06-01 NOTE — DISCHARGE INSTRUCTIONS
You are being transferred to [facility] regarding your orbital wall fracture and ocular symptoms. You also have rib fractures. Return to ER for chest pain, shortness of breath, fevers of 100.4. Please discussed the need for incentive spirometer with the emergency room at [address] [place] [address] Rd may want to limit your blowing secondary to your orbital wall fracture. The Emergency Department is not intended to be a substitute for an effort to provide complete medical care. The imaging, if any, have often been interpreted on a preliminary basis pending final reading by the radiologist. If your blood pressure was greater than 140/90, please have this blood pressure rechecked by your primary care provider nghia the next few days. You will benefit from a further discussion of lifestyle modifications that include Weight Reduction - Dietary Sodium Restriction - Increased Physical Activity and Moderation in alcohol (ETOH) Consumption. If possible check your pressure at home and keep a blood pressure log to bring to your physician.

## 2024-05-24 ENCOUNTER — HOSPITAL ENCOUNTER (OUTPATIENT)
Dept: CT IMAGING | Facility: HOSPITAL | Age: 64
Discharge: HOME | End: 2024-05-24
Attending: INTERNAL MEDICINE

## 2024-05-24 DIAGNOSIS — J44.9 CHRONIC OBSTRUCTIVE PULMONARY DISEASE (COPD) (HCC): ICD-10-CM

## 2024-05-24 DIAGNOSIS — Z87.891 FORMER TOBACCO USE: ICD-10-CM

## 2024-05-24 PROCEDURE — 71250 CT THORAX DX C-: CPT | Performed by: INTERNAL MEDICINE

## 2024-05-31 NOTE — ED QUICK NOTES
[facility] ambulance arrived to take pt to [facility], pt remains agreeable to plan of care. Pt remains A&Ox4, respirations even and unlabored with NC in place, chest rise and fall symmetrical, skin warm and dry. IV intact.
Pt c/o increased pain to L lateral ribs. Dr. Ivan Cobb notified, pain meds ordered & to be given per [facility].
Pt reports pain with movement, medicated per orders for transport.
Pt sleeping on cart, arousable to verbal stimuli.
Pt using urinal on cart, LATOYA.
RN to RN report given to Arcadio Downing at [facility]. Phone #: [phone]. Accepting MD: Dr. Natividad Romero. [facility] ETA approx 2.5 hours.
Received report, assumed care of pt. Pt resting on cart, states pain in manageable a this time. Pt is A&Ox4, respirations even and unlabored with NC in place, skin warm and dry. Pt aware of plan of care for transfer to [facility], agreeable to plan at this time. [facility] ETA 20:30. Ice pack provided.
Report given to Amanda León RN & pt care endorsed.
[facility] called by this RN for updated transfer time, ETA 34-40mins. Pt updated on new ETA, remains agreeable to plan of care for transfer.
No

## 2024-07-25 VITALS — WEIGHT: 120 LBS | BODY MASS INDEX: 19.99 KG/M2 | HEIGHT: 65 IN

## 2024-07-25 RX ORDER — SODIUM CHLORIDE 9 MG/ML
INJECTION, SOLUTION INTRAVENOUS
OUTPATIENT
Start: 2024-07-26 | End: 2024-07-26

## 2024-07-25 RX ORDER — ASPIRIN 81 MG/1
324 TABLET, CHEWABLE ORAL ONCE
OUTPATIENT
Start: 2024-07-25 | End: 2024-07-25

## 2024-07-30 ENCOUNTER — HOSPITAL ENCOUNTER (OUTPATIENT)
Dept: INTERVENTIONAL RADIOLOGY/VASCULAR | Facility: HOSPITAL | Age: 64
Discharge: HOME | End: 2024-07-30
Attending: INTERNAL MEDICINE
Payer: MEDICARE

## 2024-08-16 ENCOUNTER — HOSPITAL ENCOUNTER (OUTPATIENT)
Dept: INTERVENTIONAL RADIOLOGY/VASCULAR | Facility: HOSPITAL | Age: 64
Discharge: HOME | End: 2024-08-17
Attending: INTERNAL MEDICINE | Admitting: INTERNAL MEDICINE
Payer: MEDICARE

## 2024-08-16 DIAGNOSIS — R94.39 ABNORMAL STRESS TEST: ICD-10-CM

## 2024-08-16 PROCEDURE — B2151ZZ FLUOROSCOPY OF LEFT HEART USING LOW OSMOLAR CONTRAST: ICD-10-PCS | Performed by: INTERNAL MEDICINE

## 2024-08-16 PROCEDURE — 99153 MOD SED SAME PHYS/QHP EA: CPT | Performed by: INTERNAL MEDICINE

## 2024-08-16 PROCEDURE — 93458 L HRT ARTERY/VENTRICLE ANGIO: CPT | Performed by: INTERNAL MEDICINE

## 2024-08-16 PROCEDURE — 99152 MOD SED SAME PHYS/QHP 5/>YRS: CPT | Performed by: INTERNAL MEDICINE

## 2024-08-16 PROCEDURE — B2111ZZ FLUOROSCOPY OF MULTIPLE CORONARY ARTERIES USING LOW OSMOLAR CONTRAST: ICD-10-PCS | Performed by: INTERNAL MEDICINE

## 2024-08-16 PROCEDURE — 4A023N7 MEASUREMENT OF CARDIAC SAMPLING AND PRESSURE, LEFT HEART, PERCUTANEOUS APPROACH: ICD-10-PCS | Performed by: INTERNAL MEDICINE

## 2024-08-16 RX ORDER — METOPROLOL TARTRATE 25 MG/1
25 TABLET, FILM COATED ORAL 2 TIMES DAILY
Status: DISCONTINUED | OUTPATIENT
Start: 2024-08-16 | End: 2024-08-17

## 2024-08-16 RX ORDER — VERAPAMIL HYDROCHLORIDE 2.5 MG/ML
INJECTION, SOLUTION INTRAVENOUS
Status: COMPLETED
Start: 2024-08-16 | End: 2024-08-16

## 2024-08-16 RX ORDER — SODIUM CHLORIDE 9 MG/ML
INJECTION, SOLUTION INTRAVENOUS
Status: COMPLETED | OUTPATIENT
Start: 2024-08-16 | End: 2024-08-16

## 2024-08-16 RX ORDER — ASPIRIN 81 MG/1
324 TABLET, CHEWABLE ORAL ONCE
Status: DISCONTINUED | OUTPATIENT
Start: 2024-08-16 | End: 2024-08-17

## 2024-08-16 RX ORDER — ESCITALOPRAM OXALATE 10 MG/1
20 TABLET ORAL DAILY
Status: DISCONTINUED | OUTPATIENT
Start: 2024-08-16 | End: 2024-08-17

## 2024-08-16 RX ORDER — LIDOCAINE HYDROCHLORIDE 20 MG/ML
INJECTION, SOLUTION EPIDURAL; INFILTRATION; INTRACAUDAL; PERINEURAL
Status: COMPLETED
Start: 2024-08-16 | End: 2024-08-16

## 2024-08-16 RX ORDER — ONDANSETRON 2 MG/ML
4 INJECTION INTRAMUSCULAR; INTRAVENOUS EVERY 6 HOURS PRN
Status: DISCONTINUED | OUTPATIENT
Start: 2024-08-16 | End: 2024-08-17

## 2024-08-16 RX ORDER — ACETAMINOPHEN 500 MG
500 TABLET ORAL EVERY 4 HOURS PRN
Status: DISCONTINUED | OUTPATIENT
Start: 2024-08-16 | End: 2024-08-17

## 2024-08-16 RX ORDER — METOCLOPRAMIDE HYDROCHLORIDE 5 MG/ML
10 INJECTION INTRAMUSCULAR; INTRAVENOUS EVERY 8 HOURS PRN
Status: DISCONTINUED | OUTPATIENT
Start: 2024-08-16 | End: 2024-08-17

## 2024-08-16 RX ORDER — FLUTICASONE PROPIONATE AND SALMETEROL 250; 50 UG/1; UG/1
1 POWDER RESPIRATORY (INHALATION) EVERY 12 HOURS
Status: DISCONTINUED | OUTPATIENT
Start: 2024-08-16 | End: 2024-08-17

## 2024-08-16 RX ORDER — NITROGLYCERIN 20 MG/100ML
INJECTION INTRAVENOUS
Status: DISCONTINUED
Start: 2024-08-16 | End: 2024-08-16 | Stop reason: WASHOUT

## 2024-08-16 RX ORDER — HEPARIN SODIUM 1000 [USP'U]/ML
INJECTION, SOLUTION INTRAVENOUS; SUBCUTANEOUS
Status: COMPLETED
Start: 2024-08-16 | End: 2024-08-16

## 2024-08-16 RX ORDER — ALBUTEROL SULFATE 0.83 MG/ML
2.5 SOLUTION RESPIRATORY (INHALATION) EVERY 6 HOURS PRN
Status: DISCONTINUED | OUTPATIENT
Start: 2024-08-16 | End: 2024-08-17

## 2024-08-16 RX ORDER — ROSUVASTATIN CALCIUM 5 MG/1
5 TABLET, COATED ORAL NIGHTLY
Status: DISCONTINUED | OUTPATIENT
Start: 2024-08-16 | End: 2024-08-17

## 2024-08-16 RX ORDER — MIDAZOLAM HYDROCHLORIDE 1 MG/ML
INJECTION INTRAMUSCULAR; INTRAVENOUS
Status: COMPLETED
Start: 2024-08-16 | End: 2024-08-16

## 2024-08-16 RX ADMIN — ROSUVASTATIN CALCIUM 5 MG: 5 TABLET, COATED ORAL at 21:52:00

## 2024-08-16 RX ADMIN — Medication 25 MG: at 21:52:00

## 2024-08-16 RX ADMIN — SODIUM CHLORIDE: 9 INJECTION, SOLUTION INTRAVENOUS at 12:00:00

## 2024-08-16 RX ADMIN — METOPROLOL TARTRATE 25 MG: 25 TABLET, FILM COATED ORAL at 21:52:00

## 2024-08-16 NOTE — INTERVAL H&P NOTE
Pre-op Diagnosis: * No pre-op diagnosis entered *    The above referenced H&P was reviewed by Ceferino Bolanos MD on 8/16/2024, the patient was examined and no significant changes have occurred in the patient's condition since the H&P was performed.  I discussed with the patient and/or legal representative the potential benefits, risks and side effects of this procedure; the likelihood of the patient achieving goals; and potential problems that might occur during recuperation.  I discussed reasonable alternatives to the procedure, including risks, benefits and side effects related to the alternatives and risks related to not receiving this procedure.  We will proceed with procedure as planned.

## 2024-08-16 NOTE — IVS NOTE
Hand-Off     Procedure hand off report given to Aracely PEDRO.   Pt's vital signs are stable.   Procedural access site is dry and intact with no signs or symptoms of bleeding or hematoma.

## 2024-08-16 NOTE — DISCHARGE INSTRUCTIONS
Follow up with cardiology in 5-7 days, sooner if swelling gets worse, ok to resume aspirin if cleared by cardiology in follow up (if needed)    HOME CARE INSTRUCTIONS FOLLOWING CORONARY ANGIOGRAPHY, ANGIOPLASTY (PTCA/PTA) OR INSERTION OF STENT IN THE CORONARY    Activity  DO NOT drive after the procedure.  You may resume driving late the following day according to the nurse or physician's instructions  Plan on resting and relaxing tonight and tomorrow  Resume your normal activity after 48 hours, or as instructed by your physician  Avoid drinking alcohol for the next 24 hours  Do not make any critical decisions or sign any legal documents for the next 24 hours  Do not lift, pull, or push anything over 10 pounds for 1 week  Avoid wrist flexion, extension, and fine motor activities (i.e. Texting, typing, using a computer mouse, etc.) for 24 hours.    What is Normal?  A small lump at the procedure site associated with mild tenderness when touched  The procedure site may be bruised or discolored  There may be a small amount of drainage on the bandage    Special Instructions  Drink plenty of fluids during the next 24 hours to \"flush\" the contrast from your system  Do NOT take meformin/glucophage containing medications for 48 hours    Site Care: right wrist  For 5 days after the procedure, make sure the wrist is not submerged in water or any liquid.  Leave bandage in place for 24 hours. Then, gently wash with soap and water. Do no put any other bandage, ointment, powders, or creams to the site.  For local swelling: apply ice  If bleeding occurs, elevate the hand above the heart and apply local pressure    When you should NOTIFY YOUR PHYSICIAN  Bleeding can occur at the procedure site - both on the outside of the skin and/or beneath the surface of the skin  Swelling or a large lump at the procedure site can occur, which may be accompanied by moderate to severe pain    If either of the above occurs, (right wrist) apply  pressure to the procedure site with 2-3 fingers, as instructed by the nurse.  Hold pressure for 20 minutes and the bleeding should stop.  Notify your physician of the occurrence  If the bleeding does not stop, call 911 and continue to apply pressure    If you experience signs of a fever, temperature > 101°, chills, infection (redness, swelling, thick yellow drainage, or a foul odor from the procedure site)  If you notice any numbness, tingling, or loss of feeling to your fingers or hand, if wrist access was utilized    May call answering service at [phone] for any problems or concerns    Other  You may resume your present diet, unless otherwise specified by your physician.  You may resume all of your medications as prescribed, unless otherwise directed by your physician.  A list of your medications was provided to you at discharge.  Continue the walking program initiated in the hospital and progress your walking as directed.  Or, gradually resume your previous aerobic exercise schedule as tolerated.  Please call your physician’s office for a follow-up appointment.  You should be seen in 7-10 days.

## 2024-08-16 NOTE — IVS NOTE
Received patient from Adriana PEDRO.  Right wrist remains clean and dry. With TR Band on and armboard.  Air removed from Band in increments.

## 2024-08-16 NOTE — PROCEDURES
[facility] Cardiology  Cardiac Catheterization Report    (S): Ceferino Bolanos MD    PREOPERATIVE DIAGNOSIS: Abnormal stress    POSTOPERATIVE DIAGNOSIS: Abnormal stress    PROCEDURE PERFORMED: Left heart catheterization with selective coronary arteriography     CONSENT: The risks, benefits, and alternatives of cardiac catheterization were discussed with the patient.  The risks included, but were not limited to: bleeding, limb ischemia, deep venous thrombosis, allergic reaction, infection, stroke, myocardial infarction,  and death.  Benefits of the procedure included: symptomatic improvement, diagnosis of heart disease and prevention of myocardial infarction.  Alternatives to the procedure included: not performing cardiac catheterization, treatment with medications only, and observation. The patient verbalized understanding and agreed to proceed with the procedure.     CATHETERS: 6F  JR4; Felipe    VASCULAR CLOSURE: TR Band    SEDATION: 3 mg Versed, 100 mcg Fentanyl     DESCRIPTION OF PROCEDURE:  The patient was brought to the cardiac catheterization laboratory.      Once local anesthesia was achieved, sedation was administered. The IV was maintained by the RN and moderate conscious sedation with Versed and Fentanyl was given. The patient was assessed and monitoring of oxygen, heart rate and blood pressure by nurse and myself during the exam 1609 (time of 1st dose administered when I was present) to 1634 (procedure end time).     The right radial area was prepped and draped in a sterile manner and anesthetized with 2% lidocaine.  The right radial artery was accessed, and a 6-[place] Glidesheath was placed under modified seldinger technique.  Left and right selective coronary angiography was performed using the above catheters.  A JR4 catheter was used to cross the aortic valve, measure left ventricular pressure.  At the conclusion of the study, the right radial artery hemostasis was performed using a  radial band.       FINDINGS:      HEMODYNAMICS:    The left ventricular end diastolic pressure is 18 mmHg. There was no significant gradient upon pullback across the aortic valve.     ANGIOGRAPHY:      Left main: Large vessel giving rise to the LAD, ramus, and circumflex. Angiographically normal.    LAD: Large vessel that wraps the apex. Angiographically normal. There is a segment of myocardial bridging in the mid LAD.    Left circumflex: Large, dominant vessel that gives off the R-PDA and OM1. Angiographically normal.    RCA: ***    CONCLUSIONS:  1. Coronary artery disease as above.  2. Myocardial bridging of the mid LAD.  2. Mildly elevated left ventricular filling pressures (LVEDP 18 mmHg).    MD Robby Foley  8/16/2024  4:33 PM

## 2024-08-17 VITALS
TEMPERATURE: 98 F | DIASTOLIC BLOOD PRESSURE: 82 MMHG | HEART RATE: 66 BPM | BODY MASS INDEX: 20.19 KG/M2 | OXYGEN SATURATION: 99 % | HEIGHT: 65 IN | SYSTOLIC BLOOD PRESSURE: 142 MMHG | WEIGHT: 121.19 LBS | RESPIRATION RATE: 20 BRPM

## 2024-08-17 LAB
ANION GAP SERPL CALC-SCNC: 6 MMOL/L (ref 0–18)
BASOPHILS # BLD AUTO: 0.04 X10(3) UL (ref 0–0.2)
BASOPHILS NFR BLD AUTO: 0.6 %
BUN BLD-MCNC: 24 MG/DL (ref 9–23)
BUN/CREAT SERPL: 23.3 (ref 10–20)
CALCIUM BLD-MCNC: 9.3 MG/DL (ref 8.7–10.4)
CHLORIDE SERPL-SCNC: 106 MMOL/L (ref 98–112)
CO2 SERPL-SCNC: 28 MMOL/L (ref 21–32)
CREAT BLD-MCNC: 1.03 MG/DL
DEPRECATED RDW RBC AUTO: 43.4 FL (ref 35.1–46.3)
EGFRCR SERPLBLD CKD-EPI 2021: 81 ML/MIN/1.73M2 (ref 60–?)
EOSINOPHIL # BLD AUTO: 0.25 X10(3) UL (ref 0–0.7)
EOSINOPHIL NFR BLD AUTO: 4 %
ERYTHROCYTE [DISTWIDTH] IN BLOOD BY AUTOMATED COUNT: 13.2 % (ref 11–15)
GLUCOSE BLD-MCNC: 89 MG/DL (ref 70–99)
HCT VFR BLD AUTO: 28.8 %
HGB BLD-MCNC: 10.7 G/DL
HGB BLD-MCNC: 9.8 G/DL
IMM GRANULOCYTES # BLD AUTO: 0.03 X10(3) UL (ref 0–1)
IMM GRANULOCYTES NFR BLD: 0.5 %
LYMPHOCYTES # BLD AUTO: 1.74 X10(3) UL (ref 1–4)
LYMPHOCYTES NFR BLD AUTO: 27.8 %
MCH RBC QN AUTO: 30.4 PG (ref 26–34)
MCHC RBC AUTO-ENTMCNC: 34 G/DL (ref 31–37)
MCV RBC AUTO: 89.4 FL
MONOCYTES # BLD AUTO: 0.71 X10(3) UL (ref 0.1–1)
MONOCYTES NFR BLD AUTO: 11.4 %
NEUTROPHILS # BLD AUTO: 3.48 X10 (3) UL (ref 1.5–7.7)
NEUTROPHILS # BLD AUTO: 3.48 X10(3) UL (ref 1.5–7.7)
NEUTROPHILS NFR BLD AUTO: 55.7 %
OSMOLALITY SERPL CALC.SUM OF ELEC: 294 MOSM/KG (ref 275–295)
PLATELET # BLD AUTO: 158 10(3)UL (ref 150–450)
POTASSIUM SERPL-SCNC: 4.2 MMOL/L (ref 3.5–5.1)
RBC # BLD AUTO: 3.22 X10(6)UL
SODIUM SERPL-SCNC: 140 MMOL/L (ref 136–145)
WBC # BLD AUTO: 6.3 X10(3) UL (ref 4–11)

## 2024-08-17 PROCEDURE — 80048 BASIC METABOLIC PNL TOTAL CA: CPT | Performed by: HOSPITALIST

## 2024-08-17 PROCEDURE — 85025 COMPLETE CBC W/AUTO DIFF WBC: CPT | Performed by: HOSPITALIST

## 2024-08-17 PROCEDURE — 85018 HEMOGLOBIN: CPT | Performed by: INTERNAL MEDICINE

## 2024-08-17 RX ADMIN — METOPROLOL TARTRATE 25 MG: 25 TABLET, FILM COATED ORAL at 08:33:00

## 2024-08-17 RX ADMIN — Medication 25 MG: at 08:33:00

## 2024-08-17 RX ADMIN — FLUTICASONE PROPIONATE AND SALMETEROL 1 PUFF: 250; 50 POWDER RESPIRATORY (INHALATION) at 08:35:00

## 2024-08-17 NOTE — IVS NOTE
Received patient from Adriana PEDRO.  Right wrist clean and dry with TR band on at 12cc and armboard.  Tolerating PO fluids well.  Air removed from TR Band in increments and band removed.  Tegaderm dressing and armboard applied.  Dr. Bolanos here to see patient.  Discharge and follow-up instructions given.  At discharge ,swelling and some oozing noted to right wrist. Pressure held x 30 minutes. Ice applied.  Dr. Bolanos notified. Pt to be admitted for observation.  IV restarted left arm   Dressing and armboard re-applied with ice and elevation.  Tolerated PO food.  Report given to RN in CV overflow Katina PEDRO  Transported to CV overflow, bedside hand off given.

## 2024-08-17 NOTE — DISCHARGE PLANNING
Patient was provided with discharge instructions, education, and follow up information.  Patient verbalizes understanding of follow up information, specifically following up with Dr. Bolanos or Daphne Field, holding aspirin until discussed at the follow up appointment, post cath site care and precautions, hematoma, signs and symptoms of when to call MD or EMS. Patient has no questions after reviewing all instructions and will be going home with his brother.      Miranda PEDRO, Discharge Leader [id number]

## 2024-08-17 NOTE — CONSULTS
[facility]  part of [facility]     General Medicine Consult      Reason for consult: CAD    Consulted by: Dr. Bolanos     PCP: Nito Jeffery MD      History of Present Illness: Mr. Snow is a 64 year old male with PMH sig for COPD on home O2 of 2L, HTN, HLD, who presented for angiogram on 8/16, admitted for monitoring of right radial hematoma.  Patient is currently resting in bed, notes minimal pain around right forearm, no numbness or tingling, no CP, no worsening SOB, no fevers or chills, no nausea or vomiting, no HA or vision changes.        PMH:  Past Medical History:    Asthma (HCC)    Chronic hepatitis C (HCC)    COPD (chronic obstructive pulmonary disease) (HCC)    Depression    Essential hypertension    Pneumothorax        PSH:  Past Surgical History:   Procedure Laterality Date    Colonoscopy  7/13/15= Normal    Repeat 2025        Home Medications:  Outpatient Medications Marked as Taking for the 8/16/24 encounter (Hospital Encounter) with [facility] IVS RM2 CATH LAB   Medication Sig Dispense Refill    metoprolol tartrate 25 MG Oral Tab Take 1 tablet (25 mg total) by mouth 2 (two) times daily. 30 tablet 0    ALBUTEROL 108 (90 Base) MCG/ACT Inhalation Aero Soln INHALE 2 PUFFS INTO THE LUNGS EVERY 6 HOURS AS NEEDED FOR WHEEZING 8.5 g 0    INCRUSE ELLIPTA 62.5 MCG/INH Inhalation Aerosol Powder, Breath Activated INHALE 1 PUFF INTO THE LUNGS DAILY 90 each 1    fluorouracil 5 % External Cream Apply BID x 2 weeks the left cheek, then off 1 week, then repeat cycle 40 g 0    WIXELA INHUB 250-50 MCG/DOSE Inhalation Aerosol Powder, Breath Activated INHALE 1 PUFF INTO THE LUNGS EVERY 12 HOURS 3 each 0    Losartan Potassium-HCTZ 50-12.5 MG Oral Tab Take 1 tablet by mouth daily. 90 tablet 3    Citalopram Hydrobromide 40 MG Oral Tab Take 1 tablet (40 mg total) by mouth daily. 90 tablet 1    LORazepam 0.5 MG Oral Tab Take 1 tablet (0.5 mg total) by mouth every 6 (six) hours as needed for Anxiety. 20  tablet 0    Rosuvastatin Calcium (CRESTOR) 5 MG Oral Tab Take 1 tablet (5 mg total) by mouth nightly. 90 tablet 3    Thiamine HCl 100 MG Oral Tab Take 1 tablet (100 mg total) by mouth daily. 30 tablet 0    aspirin 325 MG Oral Tab Take 1 tablet (325 mg total) by mouth.         Scheduled Medication:   aspirin  324 mg Oral Once    escitalopram  20 mg Oral Daily    umeclidinium bromide  1 puff Inhalation Daily    rosuvastatin  5 mg Oral Nightly    fluticasone-salmeterol  1 puff Inhalation 2 times per day    metoprolol tartrate  25 mg Oral BID     Continuous Infusing Medication:    PRN Medication:  albuterol    acetaminophen    ondansetron    metoclopramide     ALL:  No Known Allergies     Soc Hx:  Social History     Tobacco Use    Smoking status: Former     Current packs/day: 0.00     Average packs/day: 1.5 packs/day for 50.0 years (75.0 ttl pk-yrs)     Types: Cigarettes     Start date: 1966     Quit date: 2016     Years since quittin.3    Smokeless tobacco: Never   Substance Use Topics    Alcohol use: Not Currently     Comment: states stopped drinking 4yrs ago        Fam Hx  No family history on file.    Review of Systems  Comprehensive ROS reviewed and negative except for what's stated above.  Including negative for chest pain, shortness of breath, syncope.       OBJECTIVE:  /75 (BP Location: Left arm)   Pulse 71   Temp 98.3 °F (36.8 °C) (Oral)   Resp 22   Ht 5' 5\" (1.651 m)   Wt 121 lb 3.2 oz (55 kg)   SpO2 100%   BMI 20.17 kg/m²   General:  Alert, no distress, appears stated age.   Head:  Normocephalic, without obvious abnormality, atraumatic.   Eyes:  Sclera anicteric, No conjunctival pallor, EOMs intact.    Nose: Nares normal. Septum midline. Mucosa normal. No drainage.   Throat: Lips, mucosa, and tongue normal. Teeth and gums normal.   Neck: Supple,     Lungs:   Wheezing noted   Chest wall:  No tenderness or deformity.   Heart:  Regular rate and rhythm, S1, S2 normal, no murmur, rub or  gallop appreciated   Abdomen:   Soft, non-tender. Bowel sounds normal. No masses,  No organomegaly. Non distended   Extremities: Right upper ext with large hematoma extending to elbow, normal cap-refill <1 sec, normal motor movement and sensation of right hand, no lower ext edema    Skin: Skin color, texture, turgor normal. No rashes or lesions.    Neurologic: Normal strength, no focal deficit appreciated       Diagnostics:   CBC/Chem  Recent Labs   Lab 08/17/24  0700   WBC 6.3   HGB 9.8*   MCV 89.4   .0       No results for input(s): \"NA\", \"K\", \"CL\", \"CO2\", \"BUN\", \"CREATSERUM\", \"GLU\", \"CA\", \"CAION\", \"MG\", \"PHOS\" in the last 168 hours.    No results for input(s): \"ALT\", \"AST\", \"ALB\", \"AMYLASE\", \"LIPASE\", \"LDH\" in the last 168 hours.    Invalid input(s): \"ALPHOS\", \"TBIL\", \"DBIL\", \"TPROT\"    No results for input(s): \"TROP\" in the last 168 hours.       Radiology: No results found.      ASSESSMENT / PLAN:  Mr. Snow is a 64 year old male with PMH sig for COPD on home O2 of 2L, HTN, HLD, who presented for angiogram on 8/16, admitted for monitoring of right radial hematoma.     Right arm radial hematoma  Acute blood loss anemia  - right hand with <3sec of cap refill, normal sensation, large bruise extending to elbow  - hgb baseline 10.9-> 9.8 post procedure   - further management per cardiology     COPD  - on 2L at baseline  - resume home inhalers, has wheezing but chronic per patient    HTN  HLD  - resume metop  - hold ARB/hydrochlorothiazide  - continue statin    FN:  IVF: none  Diet: adv doet    Prophylaxis:   DVT with holding with hematoma     Dispo: poss later today  Code status: full    Patient and/or patient's family given opportunity to ask questions and note understanding and agreeing with therapeutic plan as outlined    Thank you for allowing me to participate in the care of this patient.     Thank You,  Fernandez Galindo MD    Hospitalist with [facility]  Pager   Answering Service number:  [phone]

## 2024-08-17 NOTE — IVS NOTE
Dr. Bolanos notified via telephone about large hematoma to radial access site. Plan for overnight observation, with duly hospitalist consulted for admission orders.

## 2024-08-17 NOTE — DISCHARGE SUMMARY
General Medicine Discharge Summary     Patient ID:  Saleem Snow  64 year old  5/14/1960    Admit date: 8/16/2024    Discharge date and time: 8/17/2024    Attending Physician: Oscar Castellanos MD     Consults: IP CONSULT TO FAMILY/INTERNAL MED    Primary Care Physician: Nito Jeffery MD     Reason for admission: hematoma     Risk For Readmission: low    Discharge Diagnoses: Abnormal stress test [R94.39]  See Additional Discharge Diagnoses in Hospital Course    Discharged Condition: good    Follow-up with labs/images appointments: FU with cardiology     Exam  Gen: No acute distress  Pulm: Lungs clear, normal respiratory effort  CV: Heart with regular rate and rhythm  Abd: Abdomen soft,   EXT: right arm and hand with hematoma     HPI:     History of Present Illness: Mr. Snow is a 64 year old male with PMH sig for COPD on home O2 of 2L, HTN, HLD, who presented for angiogram on 8/16, admitted for monitoring of right radial hematoma.  Patient is currently resting in bed, notes minimal pain around right forearm, no numbness or tingling, no CP, no worsening SOB, no fevers or chills, no nausea or vomiting, no HA or vision changes.      Hospital Course:   Mr. Snow is a 64 year old male with PMH sig for COPD on home O2 of 2L, HTN, HLD, who presented for angiogram on 8/16, admitted for monitoring of right radial hematoma, seen by cardiology, hemoglobin stable, hematoma stable and evolving, cleared for discharge home per cardiology.  Recommended close follow-up within 1 week with cardiology, and PCP within 1 week, patient agreed with this plan.     Right arm radial hematoma  Acute blood loss anemia  - right hand with <3sec of cap refill, normal sensation, large bruise extending to elbow  -Angiogram without obstructive coronary disease, holding aspirin on discharge can resume with cardiology and follow-up if needed  - hgb baseline 10.9-> 9.8 post procedure -> 10.7 prior to discharge  - further management per  cardiology -> recommended follow-up in 1 week     COPD  - on 2L at baseline  - resume home inhalers, has wheezing but chronic per patient     HTN  HLD  - resume metop  - hold ARB/hydrochlorothiazide  - continue statin    Operative Procedures:      Imaging: No results found.      Disposition: home    Activity: activity as tolerated  Diet: cardiac diet  Wound Care: as directed  Code Status: DNR  O2: none    Home Medication Changes:     Med list     Medication List        CONTINUE taking these medications      albuterol 108 (90 Base) MCG/ACT Aers  Commonly known as: Ventolin HFA  INHALE 2 PUFFS INTO THE LUNGS EVERY 6 HOURS AS NEEDED FOR WHEEZING     citalopram 40 MG Tabs  Commonly known as: CeleXA  Take 1 tablet (40 mg total) by mouth daily.     fluorouracil 5 % Crea  Commonly known as: Efudex  Apply BID x 2 weeks the left cheek, then off 1 week, then repeat cycle     Incruse Ellipta 62.5 MCG/INH Aepb  Generic drug: umeclidinium bromide  INHALE 1 PUFF INTO THE LUNGS DAILY     LORazepam 0.5 MG Tabs  Commonly known as: Ativan  Take 1 tablet (0.5 mg total) by mouth every 6 (six) hours as needed for Anxiety.     losartan-hydroCHLOROthiazide 50-12.5 MG Tabs  Commonly known as: Hyzaar  Take 1 tablet by mouth daily.     metoprolol tartrate 25 MG Tabs  Commonly known as: Lopressor  Take 1 tablet (25 mg total) by mouth 2 (two) times daily.     Nebulizer/Tubing/Mouthpiece Kit  Use with nebulized medications as prescribed     rosuvastatin 5 MG Tabs  Commonly known as: Crestor  Take 1 tablet (5 mg total) by mouth nightly.     thiamine 100 MG Tabs  Commonly known as: Vitamin B1  Take 1 tablet (100 mg total) by mouth daily.     traMADol 50 MG Tabs  Commonly known as: Ultram  Take 1 tablet (50 mg total) by mouth every 6 (six) hours as needed for Pain.     Wixela Inhub 250-50 MCG/DOSE Aepb  Generic drug: fluticasone-salmeterol  INHALE 1 PUFF INTO THE LUNGS EVERY 12 HOURS            STOP taking these medications      aspirin 325 MG  Tabs              FU   Follow-up Information       Daphne Field APN. Schedule an appointment as soon as possible for a visit in 1 week(s).    Specialty: Nurse Practitioner  Why: For wound re-check, For 1-week follow-up  Contact information:  [address]   [address]  [facility] [address]  [phone]                             DC instructions:      Other Discharge Instructions:         Follow up with cardiology in 5-7 days, sooner if swelling gets worse, ok to resume aspirin if cleared by cardiology in follow up (if needed)    HOME CARE INSTRUCTIONS FOLLOWING CORONARY ANGIOGRAPHY, ANGIOPLASTY (PTCA/PTA) OR INSERTION OF STENT IN THE CORONARY    Activity  DO NOT drive after the procedure.  You may resume driving late the following day according to the nurse or physician's instructions  Plan on resting and relaxing tonight and tomorrow  Resume your normal activity after 48 hours, or as instructed by your physician  Avoid drinking alcohol for the next 24 hours  Do not make any critical decisions or sign any legal documents for the next 24 hours  Do not lift, pull, or push anything over 10 pounds for 1 week  Avoid wrist flexion, extension, and fine motor activities (i.e. Texting, typing, using a computer mouse, etc.) for 24 hours.    What is Normal?  A small lump at the procedure site associated with mild tenderness when touched  The procedure site may be bruised or discolored  There may be a small amount of drainage on the bandage    Special Instructions  Drink plenty of fluids during the next 24 hours to \"flush\" the contrast from your system  Do NOT take meformin/glucophage containing medications for 48 hours    Site Care: right wrist  For 5 days after the procedure, make sure the wrist is not submerged in water or any liquid.  Leave bandage in place for 24 hours. Then, gently wash with soap and water. Do no put any other bandage, ointment, powders, or creams to the site.  For local swelling: apply ice  If  bleeding occurs, elevate the hand above the heart and apply local pressure    When you should NOTIFY YOUR PHYSICIAN  Bleeding can occur at the procedure site - both on the outside of the skin and/or beneath the surface of the skin  Swelling or a large lump at the procedure site can occur, which may be accompanied by moderate to severe pain    If either of the above occurs, (right wrist) apply pressure to the procedure site with 2-3 fingers, as instructed by the nurse.  Hold pressure for 20 minutes and the bleeding should stop.  Notify your physician of the occurrence  If the bleeding does not stop, call 911 and continue to apply pressure    If you experience signs of a fever, temperature > 101°, chills, infection (redness, swelling, thick yellow drainage, or a foul odor from the procedure site)  If you notice any numbness, tingling, or loss of feeling to your fingers or hand, if wrist access was utilized    May call answering service at [phone] for any problems or concerns    Other  You may resume your present diet, unless otherwise specified by your physician.  You may resume all of your medications as prescribed, unless otherwise directed by your physician.  A list of your medications was provided to you at discharge.  Continue the walking program initiated in the hospital and progress your walking as directed.  Or, gradually resume your previous aerobic exercise schedule as tolerated.  Please call your physician’s office for a follow-up appointment.  You should be seen in 7-10 days.        I reconciled current and discharge medications on day of discharge, discussed changes with patient and noted changes above.       Total Time Coordinating Care: Greater than 30 minutes    Patient had opportunity to ask questions and state understand and agree with therapeutic plan as outlined    Thank You,    Fernandez Galindo MD   Hospitalist with [facility]

## 2024-08-17 NOTE — PLAN OF CARE
Problem: Patient Centered Care  Goal: Patient preferences are identified and integrated in the patient's plan of care  Description: Interventions:  - What would you like us to know as we care for you? \" I am on 3L oxygen chronically\"  - Provide timely, complete, and accurate information to patient/family  - Incorporate patient and family knowledge, values, beliefs, and cultural backgrounds into the planning and delivery of care  - Encourage patient/family to participate in care and decision-making at the level they choose  - Honor patient and family perspectives and choices  Outcome: Adequate for Discharge     Problem: CARDIOVASCULAR - ADULT  Goal: Maintains optimal cardiac output and hemodynamic stability  Description: INTERVENTIONS:  - Monitor vital signs, rhythm, and trends  - Monitor for bleeding, hypotension and signs of decreased cardiac output  - Evaluate effectiveness of vasoactive medications to optimize hemodynamic stability  - Monitor arterial and/or venous puncture sites for bleeding and/or hematoma  - Assess quality of pulses, skin color and temperature  - Assess for signs of decreased coronary artery perfusion - ex. Angina  - Evaluate fluid balance, assess for edema, trend weights  Outcome: Adequate for Discharge  Goal: Absence of cardiac arrhythmias or at baseline  Description: INTERVENTIONS:  - Continuous cardiac monitoring, monitor vital signs, obtain 12 lead EKG if indicated  - Evaluate effectiveness of antiarrhythmic and heart rate control medications as ordered  - Initiate emergency measures for life threatening arrhythmias  - Monitor electrolytes and administer replacement therapy as ordered  Outcome: Adequate for Discharge

## 2024-08-17 NOTE — PLAN OF CARE
Pt admitted for overnight obs after outpt cath. R radial site with ecchymosis. VSS  Problem: CARDIOVASCULAR - ADULT  Goal: Maintains optimal cardiac output and hemodynamic stability  Description: INTERVENTIONS:  - Monitor vital signs, rhythm, and trends  - Monitor for bleeding, hypotension and signs of decreased cardiac output  - Evaluate effectiveness of vasoactive medications to optimize hemodynamic stability  - Monitor arterial and/or venous puncture sites for bleeding and/or hematoma  - Assess quality of pulses, skin color and temperature  - Assess for signs of decreased coronary artery perfusion - ex. Angina  - Evaluate fluid balance, assess for edema, trend weights  Outcome: Progressing     Problem: CARDIOVASCULAR - ADULT  Goal: Absence of cardiac arrhythmias or at baseline  Description: INTERVENTIONS:  - Continuous cardiac monitoring, monitor vital signs, obtain 12 lead EKG if indicated  - Evaluate effectiveness of antiarrhythmic and heart rate control medications as ordered  - Initiate emergency measures for life threatening arrhythmias  - Monitor electrolytes and administer replacement therapy as ordered  Outcome: Progressing     Problem: Patient Centered Care  Goal: Patient preferences are identified and integrated in the patient's plan of care  Description: Interventions:  - What would you like us to know as we care for you? From  home with roommate  - Provide timely, complete, and accurate information to patient/family  - Incorporate patient and family knowledge, values, beliefs, and cultural backgrounds into the planning and delivery of care  - Encourage patient/family to participate in care and decision-making at the level they choose  - Honor patient and family perspectives and choices  Outcome: Progressing

## 2024-08-17 NOTE — PROGRESS NOTES
[facility]  Progress Note    Saleem Snow Patient Status:  Outpatient in a Bed    1960 MRN [id number]   Location [facility] 3W/SW Attending Oscar Castellanos MD   Hosp Day # 0 PCP Nito Jeffery MD     Impression:  1. R forearm hematoma, post radial LHC  2. abnormal nuclear stress test: normal coronary arteries (24)  3. HTN  4. HL  5. COPD    Plan:  1. hematoma, well demarcated- border not significant expanding. tender, but mostly soft. Hg 9.8 (recent 10.9).  observe for a couple more hrs, recheck H/H at noon.  If stable, dc with close outpatient f/u next wk.        Subjective:  Kept overnight for R forearm hematoma.  Tender to touch, but mostly soft. Border is not significant expanding. Otherwise, the patient denies any chest pain or dyspnea at this time.     Objective:  /78 (BP Location: Left arm)   Pulse 79   Temp 98.1 °F (36.7 °C) (Oral)   Resp 25   Ht 5' 5\" (1.651 m)   Wt 121 lb 3.2 oz (55 kg)   SpO2 100%   BMI 20.17 kg/m²   Temp (24hrs), Av.8 °F (36.6 °C), Min:97.1 °F (36.2 °C), Max:98.3 °F (36.8 °C)      Intake/Output Summary (Last 24 hours) at 2024 1153  Last data filed at 2024  Gross per 24 hour   Intake 410 ml   Output --   Net 410 ml     Wt Readings from Last 3 Encounters:   24 121 lb 3.2 oz (55 kg)   24 120 lb (54.4 kg)   23 120 lb (54.4 kg)       General: Awake and alert; in no acute distress  Cardiac: Regular rate and regular rhythm; no murmurs/rubs/gallops are appreciated  Lungs: Clear to auscultation bilaterally; no accessory muscle use  Abdomen: Soft, non-tender; bowel sounds are normoactive  Extremities: No clubbing/cyanosis; moves all 4 extremities normally. 2+ radial pulse, but tender to palpation. well demarcated forearm ecchymosis/hematoma to elbow, not significantly expanding beyond marker border. mostly soft.    Current Facility-Administered Medications   Medication Dose Route Frequency     aspirin chewable tab 324 mg  324 mg Oral Once    albuterol (Ventolin) (2.5 MG/3ML) 0.083% nebulizer solution 2.5 mg  2.5 mg Nebulization Q6H PRN    acetaminophen (Tylenol Extra Strength) tab 500 mg  500 mg Oral Q4H PRN    ondansetron (Zofran) 4 MG/2ML injection 4 mg  4 mg Intravenous Q6H PRN    metoclopramide (Reglan) 5 mg/mL injection 10 mg  10 mg Intravenous Q8H PRN    escitalopram (Lexapro) tab 20 mg  20 mg Oral Daily    umeclidinium bromide (Incruse Ellipta) 62.5 MCG/ACT inhaler 1 puff  1 puff Inhalation Daily    rosuvastatin (Crestor) tab 5 mg  5 mg Oral Nightly    fluticasone-salmeterol (Advair Diskus) 250-50 MCG/ACT inhaler 1 puff  1 puff Inhalation 2 times per day    metoprolol tartrate (Lopressor) tab 25 mg  25 mg Oral BID       Laboratory Data:  Lab Results   Component Value Date    WBC 6.3 08/17/2024    HGB 9.8 08/17/2024    HCT 28.8 08/17/2024    .0 08/17/2024     No results found for: \"INR\"  Lab Results   Component Value Date     08/17/2024    K 4.2 08/17/2024     08/17/2024    CO2 28.0 08/17/2024    BUN 24 08/17/2024    CREATSERUM 1.03 08/17/2024    GLU 89 08/17/2024    CA 9.3 08/17/2024       Telemetry: No malignant tachyarrhythmias or bradyarrhythmias      Thank you for allowing our practice to participate in the care of your patient. Please do not hesitate to contact me if you have any questions.

## (undated) NOTE — ED AVS SNAPSHOT
Usha Flow   MRN: [id number]    Department:  [facility] Emergency Department   Date of Visit:  10/27/2019           Disclosure     Insurance plans vary and the physician(s) referred by the ER may not be covered by your plan.  Please contact yo CARE PHYSICIAN AT ONCE OR RETURN IMMEDIATELY TO THE EMERGENCY DEPARTMENT. If you have been prescribed any medication(s), please fill your prescription right away and begin taking the medication(s) as directed.   If you believe that any of the medications

## (undated) NOTE — IP AVS SNAPSHOT
[facility]            (For Outpatient Use Only) Initial Admit Date: 4/20/2020   Inpt/Obs Admit Date: Inpt: 4/20/20 / Obs: N/A   Discharge Date:    Alma Delia Captain:  [de-identified]   MRN: [de-identified]   CSN: [id number]   CEID: [id number]        [facility] Account Financial Class: Medicaid Advantage    April 22, 2020

## (undated) NOTE — ED AVS SNAPSHOT
Moe Rai   MRN: [id number]    Department:  [facility] Emergency Department   Date of Visit:  10/30/2018           Disclosure     Insurance plans vary and the physician(s) referred by the ER may not be covered by your plan.  Please contact CARE PHYSICIAN AT ONCE OR RETURN IMMEDIATELY TO THE EMERGENCY DEPARTMENT. If you have been prescribed any medication(s), please fill your prescription right away and begin taking the medication(s) as directed.   If you believe that any of the medications